# Patient Record
Sex: MALE | Race: BLACK OR AFRICAN AMERICAN
[De-identification: names, ages, dates, MRNs, and addresses within clinical notes are randomized per-mention and may not be internally consistent; named-entity substitution may affect disease eponyms.]

---

## 2018-11-09 ENCOUNTER — HOSPITAL ENCOUNTER (EMERGENCY)
Dept: HOSPITAL 61 - ER | Age: 38
Discharge: HOME | End: 2018-11-09
Payer: COMMERCIAL

## 2018-11-09 VITALS — HEIGHT: 72 IN | WEIGHT: 240 LBS | BODY MASS INDEX: 32.51 KG/M2

## 2018-11-09 VITALS — SYSTOLIC BLOOD PRESSURE: 157 MMHG | DIASTOLIC BLOOD PRESSURE: 96 MMHG

## 2018-11-09 DIAGNOSIS — R13.10: Primary | ICD-10-CM

## 2018-11-09 DIAGNOSIS — J45.909: ICD-10-CM

## 2018-11-09 PROCEDURE — 70360 X-RAY EXAM OF NECK: CPT

## 2018-11-09 PROCEDURE — 99284 EMERGENCY DEPT VISIT MOD MDM: CPT

## 2018-11-09 NOTE — PHYS DOC
Past Medical History


Past Medical History:  Asthma, Sciatica


Past Surgical History:  No Surgical History


Alcohol Use:  None


Drug Use:  None





Adult General


Chief Complaint


Chief Complaint:  OTHER COMPLAINTS





[facility]


HPI





Patient is a 38  year old male with no significant medical history who presents 

today complaining of a sensation of food stuck in his throat for 2 days. 

Patient states he is able to swallow his own secretions including food. Patient 

denies eating anything including fish or bones.





Review of Systems


Review of Systems





Constitutional: Denies fever or chills []


Eyes: Denies change in visual acuity, redness, or eye pain []


HENT: Reports sensation of food stuck in the throat. Denies nasal congestion or 

sore throat []


Respiratory: Denies cough or shortness of breath []


Cardiovascular: No additional information not addressed in HPI []


GI: Denies abdominal pain, nausea, vomiting, bloody stools or diarrhea []


: Denies dysuria or hematuria []


Musculoskeletal: Denies back pain or joint pain []


Integument: Denies rash or skin lesions []


Neurologic: Denies headache, focal weakness or sensory changes []








All other systems were reviewed and found to be within normal limits, except as 

documented in this note.





Allergies


Allergies





Allergies








Coded Allergies Type Severity Reaction Last Updated Verified


 


  No Known Drug Allergies    10/16/15 No











Physical Exam


Physical Exam





Constitutional: Well developed, well nourished, no acute distress, non-toxic 

appearance. []


HENT: Normocephalic, atraumatic, bilateral external ears normal, oropharynx 

moist, no oral exudates, nose normal. []


Eyes: PERRLA, EOMI, conjunctiva normal, no discharge. [] 


Neck: Normal range of motion, no tenderness, supple, no stridor. [] 


Cardiovascular:Heart rate regular rhythm, no murmur []


Lungs & Thorax:  Bilateral breath sounds clear to auscultation []


Abdomen: Bowel sounds normal, soft, no tenderness, no masses, no pulsatile 

masses. [] 


Skin: Warm, dry, no erythema, no rash. [] 


Back: No tenderness, no CVA tenderness. [] 


Extremities: No tenderness, no cyanosis, no clubbing, ROM intact, no edema. [] 


Neurologic: Alert and oriented X 3, normal motor function, normal sensory 

function, no focal deficits noted. []


Psychologic: Affect normal, judgement normal, mood normal. []





Current Patient Data


Vital Signs





 Vital Signs








  Date Time  Temp Pulse Resp B/P (MAP) Pulse Ox O2 Delivery O2 Flow Rate FiO2


 


11/9/18 16:00  76 16 147/82 (103) 97 Room Air  


 


11/9/18 15:35 98.3       





 98.3       











EKG


EKG


[]





Radiology/Procedures


Radiology/Procedures


[]PROCEDURE: NECK SOFT TISSUE





Neck for soft tissues, 2 views, 11/9/2018:


 


HISTORY: Painful swallowing


 


The epiglottis is normal in size and configuration. Cartilaginous 


calcifications are evident in the laryngeal region. No radiopaque foreign 


body is evident. No prevertebral soft tissue swelling is seen. The 


tracheal air shadow is unremarkable.


 


IMPRESSION: No significant abnormality is detected.


 


Electronically signed by: Rick Moritz, MD (11/9/2018 4:28 PM) [facility]














DICTATED and SIGNED BY:     MORITZ,RICK S MD


DATE:     11/09/18 0228





Course & Med Decision Making


Course & Med Decision Making


Pertinent Labs and Imaging studies reviewed. (See chart for details)





This is a 38-year-old male patient presenting to the ED today with a sensation 

of something stuck in his throat. Symptoms have been going on for 2 days. 

Patient is able to tolerate his secretions with no difficulties in the ED. Has 

tolerated all meals for two days.  Soft tissue x-rays interpreted by 

radiologist were negative for any acute findings. Patient is in no distress. He 

was discharged to home .











Staff Physician Addendum:


I was working in the ER during the course of this patient's visit.  I was 

available for consultation as needed, but I was not directly involved in the 

care of this patient.





Dragon Disclaimer


Dragon Disclaimer


This electronic medical record was generated, in whole or in part, using a 

voice recognition dictation system.





Departure


Departure


Impression:  


 Primary Impression:  


 Swallowing pain


Disposition:  01 HOME, SELF-CARE


Condition:  STABLE


Referrals:  


NO PCP (PCP)








ANTHONY BUTLER MD


follow up in one week





Additional Instructions:  


You were evaluated with pain during swallowing. Your xray was negative for any 

acute findings. If his symptoms get worse come back to the emergency room 

otherwise follow-up with your own doctor in one week.











OUSMANE BUSTILLOS Nov 9, 2018 16:18


NELDA NORTH MD Nov 9, 2018 17:38

## 2018-11-09 NOTE — RAD
Neck for soft tissues, 2 views, 11/9/2018:

 

HISTORY: Painful swallowing

 

The epiglottis is normal in size and configuration. Cartilaginous 

calcifications are evident in the laryngeal region. No radiopaque foreign 

body is evident. No prevertebral soft tissue swelling is seen. The 

tracheal air shadow is unremarkable.

 

IMPRESSION: No significant abnormality is detected.

 

Electronically signed by: Rick Moritz, MD (11/9/2018 4:28 PM) [facility]